# Patient Record
(demographics unavailable — no encounter records)

---

## 2024-12-23 NOTE — PHYSICAL EXAM
[Obese] : obese [Soft] : abdomen soft [Non Tender] : non-tender [Normal Bowel Sounds] : normal bowel sounds [Normal Gait] : normal gait [No Edema] : no edema [No Cyanosis] : no cyanosis [No Clubbing] : no clubbing [Normal] : alert and oriented, normal memory

## 2024-12-24 NOTE — ADDENDUM
[FreeTextEntry1] : Patient was seen and examined during the visit and agree with above plan.   Heather Briscoe D.O. [facility] Cardiology/Vascular Cardiology -[facility] Cardiology Telephone # [phone]

## 2024-12-24 NOTE — ASSESSMENT
[FreeTextEntry1] : 52 year old female with a PMHx of obesity s/p lap band in 2012, HTN, suspected DAVID, Atrial fibrillation with RVR s/p JESSICA/DCCV on Eliquis, CHADS-VASc 2 the subsequent AF ablation on 6/15/2023 with post procedural pericarditis, presents for follow up   1.New onset AF RVR (3/2023) s/p JESSICA DCCV x 2, s/p AF Ablation 6/2023 - in normal sinus rhythm today, EKG unchanged with non specific T abnormality, asymptomatic - Continue Eliquis 5 mg q12h and Toprol XL 25mg daily - NST negative for ischemia JESSICA 5/2023 LVEF 55% - off of Eliquis and this was stopped by patient; notes she understands the risk of holding the AC and wants to still hold at this time   2. Mild DAVID, follows with Pulmonary   3. HTN: Stable on Toprol XL 25 mg daily. low sodium diet 4. Obesity, PreDM: Pt has lost weight, continue diet and exercise follow- up by PCP   Heather Briscoe D.O. Located within [facility] Cardiology/Vascular Cardiology -[facility] Cardiology Telephone # [phone]

## 2024-12-24 NOTE — ASSESSMENT
[FreeTextEntry1] : 52 year old female with a PMHx of obesity s/p lap band in 2012, HTN, suspected DAVID, Atrial fibrillation with RVR s/p JESSICA/DCCV on Eliquis, CHADS-VASc 2 the subsequent AF ablation on 6/15/2023 with post procedural pericarditis, presents for follow up   1.New onset AF RVR (3/2023) s/p JESSICA DCCV x 2, s/p AF Ablation 6/2023 - in normal sinus rhythm today, EKG unchanged with non specific T abnormality, asymptomatic - Continue Eliquis 5 mg q12h and Toprol XL 25mg daily - NST negative for ischemia JESSICA 5/2023 LVEF 55% - off of Eliquis and this was stopped by patient; notes she understands the risk of holding the AC and wants to still hold at this time   2. Mild DAVID, follows with Pulmonary   3. HTN: Stable on Toprol XL 25 mg daily. low sodium diet 4. Obesity, PreDM: Pt has lost weight, continue diet and exercise follow- up by PCP   Heather Briscoe D.O. [facility] Cardiology/Vascular Cardiology -[facility] Cardiology Telephone # [phone]

## 2024-12-24 NOTE — REVIEW OF SYSTEMS
[SOB] : shortness of breath [Lower Ext Edema] : lower extremity edema [Negative] : Heme/Lymph [Abn Vaginal Bleeding] : no unexplained vaginal bleeding

## 2024-12-24 NOTE — CARDIOLOGY SUMMARY
[de-identified] : 6/10/2024: Sinus Rhythm @ 78 bpm with normal axis and anteroseptal T wave inversions age interdeterminate  04/03/2023 Sinus bradycardia 56 bpm, Non specific T abnormality  12/5/2023 Sinus LVH, non specific T abnormality [de-identified] : 2023 Conclusions: 1. Patient achieved 8 METS, which is consistent with average exercise capacity considering age and other clinical characteristics. 2. Baseline ECG: sinus rhythm at a rate of 67 bpm with nonspecific ST-T wave abnormalities. 3. Stress EC.0 mm horizontal ST segment depression in leads II, AVF, V5, V6 and III starting at 7:30 minutes during stress at 162 bpm and persisting 00:30 minutes into recovery. 4. Arrhythmias: Rare VPD's occurred during stress and recovery. 5. Normal myocardial perfusion scan, with no evidence of infarction or inducible ischemia. 6. The stress left ventricular EF% is 83 %. The stress end diastolic volume is 94 ml and systolic volume is 16 ml. 7. Despite the exercise ST segment changes, the myocardial scans are normal.  [de-identified] : 04/03/2023 JESSICA at [facility] EF 50-55%, mildly enlarged LA, mild to mod TR, PASP 48.4 mmHg, dilation of ascending aorta 3.52 cm, intact intra-atrial septum, no evidence of PFO, no LAURENCE thrombus   12/5/2023 JESSICA LVEF 55%

## 2024-12-24 NOTE — ADDENDUM
[FreeTextEntry1] : Patient was seen and examined during the visit and agree with above plan.   Heather Briscoe D.O. [facility] Cardiology/Vascular Cardiology -[facility] Cardiology Telephone # [phone]

## 2024-12-24 NOTE — CARDIOLOGY SUMMARY
[de-identified] : 6/10/2024: Sinus Rhythm @ 78 bpm with normal axis and anteroseptal T wave inversions age interdeterminate  04/03/2023 Sinus bradycardia 56 bpm, Non specific T abnormality  12/5/2023 Sinus LVH, non specific T abnormality [de-identified] : 2023 Conclusions: 1. Patient achieved 8 METS, which is consistent with average exercise capacity considering age and other clinical characteristics. 2. Baseline ECG: sinus rhythm at a rate of 67 bpm with nonspecific ST-T wave abnormalities. 3. Stress EC.0 mm horizontal ST segment depression in leads II, AVF, V5, V6 and III starting at 7:30 minutes during stress at 162 bpm and persisting 00:30 minutes into recovery. 4. Arrhythmias: Rare VPD's occurred during stress and recovery. 5. Normal myocardial perfusion scan, with no evidence of infarction or inducible ischemia. 6. The stress left ventricular EF% is 83 %. The stress end diastolic volume is 94 ml and systolic volume is 16 ml. 7. Despite the exercise ST segment changes, the myocardial scans are normal.  [de-identified] : 04/03/2023 JESSICA at [facility] EF 50-55%, mildly enlarged LA, mild to mod TR, PASP 48.4 mmHg, dilation of ascending aorta 3.52 cm, intact intra-atrial septum, no evidence of PFO, no LAURENCE thrombus   12/5/2023 JESSICA LVEF 55%

## 2024-12-24 NOTE — HISTORY OF PRESENT ILLNESS
[FreeTextEntry1] : 52 year old female with a PMHx of obesity s/p lap band in 2012, HTN, suspected DAVID, Atrial fibrillation with RVR s/p JESSICA/DCCV on Eliquis, CHADS-VASc 2 the subsequent AF ablation on 6/15/2023 with post procedural pericarditis, presents for follow up   Reports feeling well but notes swelling of the lower extremity with some shortness of breath, no hypoxemia dizziness lightheadedness orthopnea or PND No associated chest pain  Patient notes that since the last visit has been off of her Eliquis and notes no complaints of any palpitations so further discussed the risk of being of AC and would like to still be off despite the stroke risk with CHADS-VASC 2 (low risk)

## 2025-06-24 NOTE — ADDENDUM
[FreeTextEntry1] : Patient was seen and examined during the visit and agree with above plan.   Heather Briscoe D.O. [facility] Cardiology/Vascular Cardiology -[facility] Cardiology Telephone # [phone]

## 2025-06-24 NOTE — HISTORY OF PRESENT ILLNESS
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

## 2025-06-24 NOTE — ASSESSMENT
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%, non ischemic  - no chest pain or shortness of breath but has been having palpitations and dizziness possibly secondary to paronyms of Afib  - EKG today Sinus Rhythm @ 68 bpm WITHIN NORMAL LIMITS;  EKG was obtained to assist in the diagnosed and management of assessed problem(s) - on Toprol XL 50mg poq  daily and will start Entresto 24/26mg po q daily  - follow up TTE in 3 months   2) Paroxysmal AF RVR (3/2023) s/p JESSICA DCCV x 2, s/p AF Ablation 6/2023 and CHADS VASc of 3, recent hospitalization for this and self converted  - on Eliquis 5 mg q12h and Toprol XL 50 mg daily - NST negative for ischemia and LHC in 2025 shows no evidence of CAD  - message sent ot Dr. Gurrola to be evaluated for possible need for AF ablation   2. Mild DAVID, follows with Pulmonary   3. HTN: Stable on Toprol XL 50mg po q daily and will start Entresto 24/26mgpo BID    Patient was advised to contact the office or seek emergency medical care for any new, worsening or concerning symptoms. Patient verbalized understanding and is in agreement with the above plan.  Heather Briscoe D.O. [facility] Cardiology/Vascular Cardiology -[facility] Cardiology Telephone # [phone]

## 2025-06-24 NOTE — CARDIOLOGY SUMMARY
[de-identified] : Sinus Rhythm @ 68 bpm WITHIN NORMAL LIMITS 6/10/2024: Sinus Rhythm @ 78 bpm with normal axis and anteroseptal T wave inversions age interdeterminate  04/03/2023 Sinus bradycardia 56 bpm, Non specific T abnormality  12/5/2023 Sinus LVH, non specific T abnormality [de-identified] : 2023 Conclusions: 1. Patient achieved 8 METS, which is consistent with average exercise capacity considering age and other clinical characteristics. 2. Baseline ECG: sinus rhythm at a rate of 67 bpm with nonspecific ST-T wave abnormalities. 3. Stress EC.0 mm horizontal ST segment depression in leads II, AVF, V5, V6 and III starting at 7:30 minutes during stress at 162 bpm and persisting 00:30 minutes into recovery. 4. Arrhythmias: Rare VPD's occurred during stress and recovery. 5. Normal myocardial perfusion scan, with no evidence of infarction or inducible ischemia. 6. The stress left ventricular EF% is 83 %. The stress end diastolic volume is 94 ml and systolic volume is 16 ml. 7. Despite the exercise ST segment changes, the myocardial scans are normal.  [de-identified] : 04/03/2023 JESSICA at [facility] EF 50-55%, mildly enlarged LA, mild to mod TR, PASP 48.4 mmHg, dilation of ascending aorta 3.52 cm, intact intra-atrial septum, no evidence of PFO, no LAURENCE thrombus   12/5/2023 JESSICA LVEF 55%

## 2025-07-23 NOTE — DISCUSSION/SUMMARY
[FreeTextEntry1] : 52F with pmhx symptomatic paroxysmal atrial fibrillation s/p AF ablation (June 2023), obesity (post lap band surgery in 2012), hypertension, and suspected DAVID presenting for follow up of recurrent AF today.   She has AF recurrence despite prior ablation procedure. TTE 06/25 with mildly depressed ef 45% in setting of AF RVR. Given symptoms and low EF should pursue rhythm control. Discussed risk/benefit profile of repeat ablation procedure which she is agreeable to. Continue eliquis for stroke ppx. F/u post procedurally or sooner prn.  [EKG obtained to assist in diagnosis and management of assessed problem(s)] : EKG obtained to assist in diagnosis and management of assessed problem(s)

## 2025-07-23 NOTE — PHYSICAL EXAM
[Normal] : well developed, well nourished, no acute distress [No Acute Distress] : no acute distress [Good Air Entry] : good air entry [No Respiratory Distress] : no respiratory distress  [Normal Gait] : normal gait [Moves all extremities] : moves all extremities [Alert and Oriented] : alert and oriented

## 2025-07-23 NOTE — HISTORY OF PRESENT ILLNESS
[FreeTextEntry1] : 52F with pmhx symptomatic paroxysmal atrial fibrillation s/p AF ablation (June 2023), obesity (post lap band surgery in 2012), hypertension, and suspected DAVID presenting for follow up of recurrent AF today.   In summary, she was admitted to [facility] in 03/2023 with palpitations and SOB. SHe was diagnosed with new onset AF RVR. A transesophageal echocardiogram showed a left ventricular ejection fraction of 50-55%, mildly enlarged left atrium, mild to moderate tricuspid regurgitation. She underwent successful direct current cardioversion to normal sinus rhythm on 3/16/23. The patient was discharged on Toprol XL 50 mg and Eliquis 5 mg twice daily (CHADS-VASc score of 2 due to hypertension and female sex).  She wore an MCOT for 30 days which did not show any evidence of atrial fibrillation. She then presented to [facility] with recurrent symptomatic AF with RVR and underwent repeat DCCV 5/8/23. Flecainide 75mg BID initiated then underwent catheter ablation of symptomatic paroxysmal atrial fibrillation (PVI, CTI line) on 6/15/23.   Post procedure she presented to ER with symptoms of post procedure pericarditis. Colchicine initiated.  In June 2025, she presented to the ED again with palpitations, found to be in AF RVR recurrently. C neg for CAD. PLan was for DCCV but she spontaneously converted to SR. Presnts today for follow up.   Has been feeling fairly. Intermittent palpitations lasting a few hours at a time at home. Denies chest pain, sob, ROSAS, syncope, lightheadedness, dizziness.  Tolerating AC without significant bleeding concerns, bruising or falls.

## 2025-07-23 NOTE — REVIEW OF SYSTEMS
[Feeling Fatigued] : feeling fatigued [Dyspnea on exertion] : not dyspnea during exertion [Chest Discomfort] : no chest discomfort [Palpitations] : palpitations [Dizziness] : no dizziness [Easy Bleeding] : no tendency for easy bleeding [FreeTextEntry5] : see hpi